# Patient Record
Sex: MALE | Race: OTHER | NOT HISPANIC OR LATINO
[De-identification: names, ages, dates, MRNs, and addresses within clinical notes are randomized per-mention and may not be internally consistent; named-entity substitution may affect disease eponyms.]

---

## 2023-10-11 ENCOUNTER — NON-APPOINTMENT (OUTPATIENT)
Age: 63
End: 2023-10-11

## 2023-12-07 ENCOUNTER — EMERGENCY (EMERGENCY)
Facility: HOSPITAL | Age: 63
LOS: 1 days | Discharge: HOME | End: 2023-12-07
Attending: EMERGENCY MEDICINE
Payer: COMMERCIAL

## 2023-12-07 VITALS
TEMPERATURE: 98 F | SYSTOLIC BLOOD PRESSURE: 124 MMHG | HEART RATE: 75 BPM | RESPIRATION RATE: 16 BRPM | OXYGEN SATURATION: 96 % | HEIGHT: 67 IN | WEIGHT: 195.11 LBS | DIASTOLIC BLOOD PRESSURE: 80 MMHG

## 2023-12-07 PROCEDURE — 27780 TREATMENT OF FIBULA FRACTURE: CPT | Mod: 54,RT

## 2023-12-07 PROCEDURE — 29515 APPLICATION SHORT LEG SPLINT: CPT | Mod: RT

## 2023-12-07 PROCEDURE — 99284 EMERGENCY DEPT VISIT MOD MDM: CPT | Mod: 57

## 2023-12-07 PROCEDURE — 99283 EMERGENCY DEPT VISIT LOW MDM: CPT | Mod: 25

## 2023-12-07 PROCEDURE — 73610 X-RAY EXAM OF ANKLE: CPT | Mod: 26,RT

## 2023-12-07 PROCEDURE — 73610 X-RAY EXAM OF ANKLE: CPT

## 2023-12-07 RX ORDER — OXYCODONE AND ACETAMINOPHEN 5; 325 MG/1; MG/1
1 TABLET ORAL
Qty: 12 | Refills: 0
Start: 2023-12-07 | End: 2023-12-09

## 2023-12-07 RX ORDER — ACETAMINOPHEN 500 MG
650 TABLET ORAL ONCE
Refills: 0 | Status: COMPLETED | OUTPATIENT
Start: 2023-12-07 | End: 2023-12-07

## 2023-12-07 RX ORDER — IBUPROFEN 200 MG
1 TABLET ORAL
Qty: 21 | Refills: 0
Start: 2023-12-07 | End: 2023-12-13

## 2023-12-07 RX ORDER — IBUPROFEN 200 MG
600 TABLET ORAL ONCE
Refills: 0 | Status: COMPLETED | OUTPATIENT
Start: 2023-12-07 | End: 2023-12-07

## 2023-12-07 RX ADMIN — Medication 650 MILLIGRAM(S): at 19:30

## 2023-12-07 RX ADMIN — Medication 650 MILLIGRAM(S): at 18:49

## 2023-12-07 RX ADMIN — Medication 600 MILLIGRAM(S): at 18:49

## 2023-12-07 RX ADMIN — Medication 600 MILLIGRAM(S): at 19:30

## 2023-12-07 NOTE — ED ADULT NURSE NOTE - CAS EDN DISCHARGE INTERVENTIONS
crutches given for ambulation with instructions and return demonstRATION DONE right ankle splint crutches given for ambulation with instructions and return demonstRATION DONE

## 2023-12-07 NOTE — ED PROVIDER NOTE - NSFOLLOWUPCLINICS_GEN_ALL_ED_FT
[facility]  Podiatry/Wound Care  [address] [place] [address]  Phone: [phone]  Fax: [phone]     [facility]  Podiatry/Wound Care  [address] [place] [address]  Phone: [phone]  Fax: [phone]

## 2023-12-07 NOTE — ED PROVIDER NOTE - PATIENT PORTAL LINK FT
You can access the FollowMyHealth Patient Portal offered by [facility] by registering at the following website: [email or web address]. By joining [employer]’s FollowMyHealth portal, you will also be able to view your health information using other applications (apps) compatible with our system. You can access the FollowMyHealth Patient Portal offered by [facility] by registering at the following website: [email or web address]. By joining [employer]’s FollowMyHealth portal, you will also be able to view your health information using other applications (apps) compatible with our system.

## 2023-12-07 NOTE — ED PROVIDER NOTE - OBJECTIVE STATEMENT
63 year old male presents with right ankle pain s/p a fall 1 week ago.  was walking down a ramp in his apt building and fell.  initially was able to ambulate but then the next day much more pain. has been using topical pain meds without relief. denies all other complaints.

## 2023-12-07 NOTE — ED ADULT NURSE NOTE - NSFALLUNIVINTERV_ED_ALL_ED
Bed/Stretcher in lowest position, wheels locked, appropriate side rails in place/Call bell, personal items and telephone in reach/Instruct patient to call for assistance before getting out of bed/chair/stretcher/Non-slip footwear applied when patient is off stretcher/[place] to call system/Physically safe environment - no spills, clutter or unnecessary equipment/Purposeful proactive rounding/Room/bathroom lighting operational, light cord in reach Bed/Stretcher in lowest position, wheels locked, appropriate side rails in place/Call bell, personal items and telephone in reach/Instruct patient to call for assistance before getting out of bed/chair/stretcher/Non-slip footwear applied when patient is off stretcher/[place] to call system/Physically safe environment - no spills, clutter or unnecessary equipment/Purposeful proactive rounding/Room/bathroom lighting operational, light cord in reach

## 2023-12-07 NOTE — ED PROVIDER NOTE - NSFOLLOWUPINSTRUCTIONS_ED_ALL_ED_FT
Ankle Fracture    The ankle joint is made up of the lower (distal) sections of the lower leg bones, called the tibia and fibula, along with a bone in the foot called the talus. An ankle fracture is a break in one, two, or all three of these sections of bone. There are two general types of ankle fractures:  Stable fracture. This happens when one of the bones is broken, but the bones of the ankle joint stay in their normal positions.  Unstable fracture. This type can include more than one broken bone. It can also happen if the outer bone is broken and the strong tissues that connect bones to each other (ligaments) are also injured at the inner ankle. This type of fracture allows the talus to move out of its normal position.  What are the causes?  This condition may be caused by:  A hard, direct hit to the ankle.  Quickly and severely twisting your ankle, often while your foot is planted and the rest of your body is moving.  Trauma, such as from a car crash or a fall from a height.  What increases the risk?  The following factors may make you more likely to develop this condition:  Being overweight.  Participating in sports that involve quick direction changes, as in soccer.  Doing high-impact sports such as gymnastics or football.  What are the signs or symptoms?    Symptoms of this condition include:  A tender and swollen ankle.  Bruising around your injured ankle.  Pain when moving or pressing on your ankle.  Trouble walking or using your ankle to support your body weight (putting weight on your ankle).  Pain that gets worse when you move your foot or ankle or when you stand.  Pain that gets better with rest.  How is this diagnosed?  An ankle fracture is usually diagnosed with a physical exam and X-rays. You may also have a CT scan or an MRI.    How is this treated?  Treatment for this condition depends on the type of ankle fracture you have. Stable fractures are treated with a cast, boot, or splint to hold the ankle still and crutches to avoid putting weight on the ankle until the fracture heals. Unstable fractures require surgery to ensure that the bones heal properly. After surgery, you will have a splint. After your incision has healed, your surgeon may give you a cast or a boot. You will not be able to put weight on your injured side for several weeks.    After your ankle has healed, you will do physical therapy exercises to improve movement and strength in your ankle.    Follow these instructions at home:  If you have a boot or splint:    Wear the boot or splint as told by your health care provider. Remove it only as told by your health care provider.  Loosen it if your toes tingle, become numb, or turn cold and blue.  Keep it clean and dry.  If you have a cast:    Do not put pressure on any part of the cast until it is fully hardened. This may take several hours.  Do not stick anything inside the cast to scratch your skin. Doing that increases your risk of infection.  Check the skin around the cast every day. Tell your health care provider about any concerns.  You may put lotion on dry skin around the edges of the cast. Do not put lotion on the skin underneath the cast.  Keep it clean and dry.  Bathing    Do not take baths, swim, or use a hot tub until your health care provider approves. Ask your health care provider if you may take showers. You may only be allowed to take sponge baths.  If the cast, boot, or splint is not waterproof:  Do not let it get wet.  Cover it with a watertight covering when you take a bath or shower.  Managing pain, stiffness, and swelling      If directed, put ice on the injured area. To do this:  If you have a removable splint or boot, remove it as told by your health care provider.  Put ice in a plastic bag.  Place a towel between your skin and the bag or between your cast and the bag.  Leave the ice on for 20 minutes, 2–3 times a day.  Remove the ice if your skin turns bright red. This is very important. If you cannot feel pain, heat, or cold, you have a greater risk of damage to the area.  Move your toes often to reduce stiffness and swelling.  Raise (elevate) the injured area above the level of your heart while you are sitting or lying down.  Activity    Do exercises as told by your health care provider.  Return to your normal activities as told by your health care provider. Ask your health care provider what activities are safe for you.  Do not use the injured limb to support your body weight until your health care provider says that you can. Use crutches as told by your health care provider.  General instructions    Take over-the-counter and prescription medicines only as told by your health care provider.  Ask your health care provider when it is safe to drive if you have a cast, boot, or splint on your ankle.  Do not use any products that contain nicotine or tobacco, such as cigarettes, e-cigarettes, and chewing tobacco. These can delay bone healing. If you need help quitting, ask your health care provider.  Keep all follow-up visits. This is important.  Contact a health care provider if:  You have pain or swelling that gets worse or does not get better with rest or medicine.  Your cast gets damaged.  Get help right away if:  You have severe pain that lasts.  You develop new pain or swelling.  Your skin or toenails below the injury turn blue or gray, feel cold, become numb, or are less sensitive to the touch.  Summary  An ankle fracture can be stable or unstable. This is determined after a physical exam and imaging studies such as X-rays, a CT scan, or an MRI.  Stable fractures are treated with a cast, boot, or splint to hold the ankle still until the fracture heals. Unstable fractures require surgery to ensure that the bones heal properly.  You will not be able to put weight on your injured side for several weeks.  Medicines, icing, and raising (elevating) your injured ankle when you are sitting or lying down may help with pain relief. Follow instructions as told by your health care provider.  This information is not intended to replace advice given to you by your health care provider. Make sure you discuss any questions you have with your health care provider.    Document Revised: 03/18/2021 Document Reviewed: 03/18/2021  [employer] Patient Education © 2023 [employer]  [employer] logo  Terms and Conditions  Privacy Policy  Editorial Policy  All content on this site: Copyright © 2023 [employer], its licensors, and contributors. All rights are reserved, including those for text and data mining, AI training, and similar technologies. For all open access content, the Creative Commons licensing terms apply.  Cookies are used by this site. To decline or learn more, visit our Cookies page. Ankle Fracture    The ankle joint is made up of the lower (distal) sections of the lower leg bones, called the tibia and fibula, along with a bone in the foot called the talus. An ankle fracture is a break in one, two, or all three of these sections of bone. There are two general types of ankle fractures:  Stable fracture. This happens when one of the bones is broken, but the bones of the ankle joint stay in their normal positions.  Unstable fracture. This type can include more than one broken bone. It can also happen if the outer bone is broken and the strong tissues that connect bones to each other (ligaments) are also injured at the inner ankle. This type of fracture allows the talus to move out of its normal position.  What are the causes?  This condition may be caused by:  A hard, direct hit to the ankle.  Quickly and severely twisting your ankle, often while your foot is planted and the rest of your body is moving.  Trauma, such as from a car crash or a fall from a height.  What increases the risk?  The following factors may make you more likely to develop this condition:  Being overweight.  Participating in sports that involve quick direction changes, as in soccer.  Doing high-impact sports such as gymnastics or football.  What are the signs or symptoms?    Symptoms of this condition include:  A tender and swollen ankle.  Bruising around your injured ankle.  Pain when moving or pressing on your ankle.  Trouble walking or using your ankle to support your body weight (putting weight on your ankle).  Pain that gets worse when you move your foot or ankle or when you stand.  Pain that gets better with rest.  How is this diagnosed?  An ankle fracture is usually diagnosed with a physical exam and X-rays. You may also have a CT scan or an MRI.    How is this treated?  Treatment for this condition depends on the type of ankle fracture you have. Stable fractures are treated with a cast, boot, or splint to hold the ankle still and crutches to avoid putting weight on the ankle until the fracture heals. Unstable fractures require surgery to ensure that the bones heal properly. After surgery, you will have a splint. After your incision has healed, your surgeon may give you a cast or a boot. You will not be able to put weight on your injured side for several weeks.    After your ankle has healed, you will do physical therapy exercises to improve movement and strength in your ankle.    Follow these instructions at home:  If you have a boot or splint:    Wear the boot or splint as told by your health care provider. Remove it only as told by your health care provider.  Loosen it if your toes tingle, become numb, or turn cold and blue.  Keep it clean and dry.  If you have a cast:    Do not put pressure on any part of the cast until it is fully hardened. This may take several hours.  Do not stick anything inside the cast to scratch your skin. Doing that increases your risk of infection.  Check the skin around the cast every day. Tell your health care provider about any concerns.  You may put lotion on dry skin around the edges of the cast. Do not put lotion on the skin underneath the cast.  Keep it clean and dry.  Bathing    Do not take baths, swim, or use a hot tub until your health care provider approves. Ask your health care provider if you may take showers. You may only be allowed to take sponge baths.  If the cast, boot, or splint is not waterproof:  Do not let it get wet.  Cover it with a watertight covering when you take a bath or shower.  Managing pain, stiffness, and swelling      If directed, put ice on the injured area. To do this:  If you have a removable splint or boot, remove it as told by your health care provider.  Put ice in a plastic bag.  Place a towel between your skin and the bag or between your cast and the bag.  Leave the ice on for 20 minutes, 2–3 times a day.  Remove the ice if your skin turns bright red. This is very important. If you cannot feel pain, heat, or cold, you have a greater risk of damage to the area.  Move your toes often to reduce stiffness and swelling.  Raise (elevate) the injured area above the level of your heart while you are sitting or lying down.  Activity    Do exercises as told by your health care provider.  Return to your normal activities as told by your health care provider. Ask your health care provider what activities are safe for you.  Do not use the injured limb to support your body weight until your health care provider says that you can. Use crutches as told by your health care provider.  General instructions    Take over-the-counter and prescription medicines only as told by your health care provider.  Ask your health care provider when it is safe to drive if you have a cast, boot, or splint on your ankle.  Do not use any products that contain nicotine or tobacco, such as cigarettes, e-cigarettes, and chewing tobacco. These can delay bone healing. If you need help quitting, ask your health care provider.  Keep all follow-up visits. This is important.  Contact a health care provider if:  You have pain or swelling that gets worse or does not get better with rest or medicine.  Your cast gets damaged.  Get help right away if:  You have severe pain that lasts.  You develop new pain or swelling.  Your skin or toenails below the injury turn blue or gray, feel cold, become numb, or are less sensitive to the touch.  Summary  An ankle fracture can be stable or unstable. This is determined after a physical exam and imaging studies such as X-rays, a CT scan, or an MRI.  Stable fractures are treated with a cast, boot, or splint to hold the ankle still until the fracture heals. Unstable fractures require surgery to ensure that the bones heal properly.  You will not be able to put weight on your injured side for several weeks.  Medicines, icing, and raising (elevating) your injured ankle when you are sitting or lying down may help with pain relief. Follow instructions as told by your health care provider.  This information is not intended to replace advice given to you by your health care provider. Make sure you discuss any questions you have with your health care provider.    Document Revised: 03/18/2021 Document Reviewed: 03/18/2021  [employer] Patient Education © 2023 [employer]  [employer] logo  Terms and Conditions  Privacy Policy  Editorial Policy  All content on this site: Copyright © 2023 [employer], its licensors, and contributors. All rights are reserved, including those for text and data mining, AI training, and similar technologies. For all open access content, the Creative Commons licensing terms apply.  Cookies are used by this site. To decline or learn more, visit our Cookies page.

## 2023-12-07 NOTE — ED PROVIDER NOTE - PROGRESS NOTE DETAILS
xray with distal fibular fracture. splinted. given crutches. will dc. f/u with podiatry. return precautions discussed.

## 2023-12-12 ENCOUNTER — APPOINTMENT (OUTPATIENT)
Dept: PODIATRY | Facility: CLINIC | Age: 63
End: 2023-12-12
Payer: MEDICARE

## 2023-12-12 DIAGNOSIS — Z82.49 FAMILY HISTORY OF ISCHEMIC HEART DISEASE AND OTHER DISEASES OF THE CIRCULATORY SYSTEM: ICD-10-CM

## 2023-12-12 DIAGNOSIS — I10 ESSENTIAL (PRIMARY) HYPERTENSION: ICD-10-CM

## 2023-12-12 DIAGNOSIS — Z78.9 OTHER SPECIFIED HEALTH STATUS: ICD-10-CM

## 2023-12-12 PROBLEM — Z00.00 ENCOUNTER FOR PREVENTIVE HEALTH EXAMINATION: Status: ACTIVE | Noted: 2023-12-12

## 2023-12-12 PROCEDURE — 27786 TREATMENT OF ANKLE FRACTURE: CPT | Mod: RT

## 2023-12-12 PROCEDURE — 99203 OFFICE O/P NEW LOW 30 MIN: CPT | Mod: 57,25

## 2023-12-12 PROCEDURE — 73610 X-RAY EXAM OF ANKLE: CPT | Mod: RT

## 2023-12-12 RX ORDER — METOPROLOL SUCCINATE 50 MG/1
50 TABLET, EXTENDED RELEASE ORAL
Refills: 0 | Status: ACTIVE | COMMUNITY

## 2023-12-12 RX ORDER — ATORVASTATIN CALCIUM 80 MG/1
80 TABLET, FILM COATED ORAL
Refills: 0 | Status: ACTIVE | COMMUNITY

## 2023-12-12 RX ORDER — ASPIRIN 81 MG/1
81 TABLET ORAL
Refills: 0 | Status: ACTIVE | COMMUNITY

## 2023-12-12 RX ORDER — AMLODIPINE BESYLATE 10 MG/1
10 TABLET ORAL
Refills: 0 | Status: ACTIVE | COMMUNITY

## 2023-12-12 RX ORDER — CLOPIDOGREL BISULFATE 75 MG/1
75 TABLET, FILM COATED ORAL
Refills: 0 | Status: ACTIVE | COMMUNITY

## 2023-12-14 NOTE — ASSESSMENT
[FreeTextEntry1] : Impression: Non-displaced fracture, right ankle, closed (S82.64XA)  Treatment: Patient was placed into a Cam boot by the orthotist.  He can be full weight bearing.  Will reevaluate in 2 weeks. Any questions or problems, patient is to contact the office.

## 2023-12-14 NOTE — PROCEDURE
[FreeTextEntry1] : X-rays from [facility] were independently reviewed. (3 views - right ankle, non-weight bearing) There is a distal fibula fracture.  No medial malleolus.  He has minimal swelling about the ankle.  At this time, I feel that a full weight bearing x-ray will determine whether there has been any increase in medial clear space.    (3 views - right ankle, weight bearing) These were taken today.   No significant displacement of the fibula.  No shift of the mortise.  No increase in medial clear space.

## 2023-12-14 NOTE — HISTORY OF PRESENT ILLNESS
[FreeTextEntry1] : Patient presents today for follow up of a right ankle fracture that occurred 2 weeks ago.  He was seen at [facility].  He presents today with an A/O splint and he is using crutches.

## 2023-12-14 NOTE — PHYSICAL EXAM
[General Appearance - Alert] : alert [Oriented To Time, Place, And Person] : oriented to person, place, and time [Varicose Veins Of Lower Extremities] : not present [] : not present [Delayed in the Right Toes] : capillary refills normal in right toes [Delayed in the Left Toes] : capillary refills normal in the left toes [Foot Ulcer] : no foot ulcer [Skin Induration] : no skin induration

## 2024-01-03 ENCOUNTER — APPOINTMENT (OUTPATIENT)
Dept: PODIATRY | Facility: CLINIC | Age: 64
End: 2024-01-03
Payer: MEDICARE

## 2024-01-03 DIAGNOSIS — S82.64XA NONDISPLACED FRACTURE OF LATERAL MALLEOLUS OF RIGHT FIBULA, INITIAL ENCOUNTER FOR CLOSED FRACTURE: ICD-10-CM

## 2024-01-03 PROCEDURE — 73610 X-RAY EXAM OF ANKLE: CPT | Mod: RT

## 2024-01-04 NOTE — REASON FOR VISIT
[Follow-Up Visit] : a follow-up visit for [Other:___] : [unfilled] [Confirmed Ankle Fracture] : confirmed ankle fracture

## 2024-01-05 NOTE — PROCEDURE
[FreeTextEntry1] : X-rays were taken to evaluate healing. (3 views - right ankle) No callus noted about the fibula.  No shift in the mortise.  It is healing satisfactorily.

## 2024-01-05 NOTE — PHYSICAL EXAM
[General Appearance - Alert] : alert [Ankle Swelling (On Exam)] : present [Ankle Swelling On The Right] : mild [2+] : left foot dorsalis pedis 2+ [Skin Color & Pigmentation] : normal skin color and pigmentation [Skin Turgor] : normal skin turgor [Skin Lesions] : no skin lesions [Sensation] : the sensory exam was normal to light touch and pinprick [No Focal Deficits] : no focal deficits [Deep Tendon Reflexes (DTR)] : deep tendon reflexes were 2+ and symmetric [Motor Exam] : the motor exam was normal [Varicose Veins Of Lower Extremities] : not present [] : not present [Delayed in the Right Toes] : capillary refills normal in right toes [Delayed in the Left Toes] : capillary refills normal in the left toes [de-identified] : Patient has minimal pain.  There is good ROM.   [Foot Ulcer] : no foot ulcer [Skin Induration] : no skin induration [FreeTextEntry1] : There is minimal swelling.

## 2024-01-05 NOTE — HISTORY OF PRESENT ILLNESS
[Sneakers] : grant [FreeTextEntry1] : Patient presents today for reevaluation of right fibula fracture.  He is doing well.  No new changes to his medical status.

## 2024-01-05 NOTE — ASSESSMENT
[FreeTextEntry1] : Impression: Non-displaced fracture, right ankle, closed (S82.64XA)  Treatment: Patient can attempt to transition out of the Cam boot over the next few weeks.  Will reevaluate in 3 weeks.  Any questions or problems, patient is to contact the office.

## 2024-01-30 ENCOUNTER — APPOINTMENT (OUTPATIENT)
Dept: PODIATRY | Facility: CLINIC | Age: 64
End: 2024-01-30

## 2025-06-06 NOTE — ED ADULT NURSE NOTE - PRO INTERPRETER NEED 2
Patient called to let us know to expect a surgical clearance request from Dr. Ennis for a dental extraction for which they want him to stop his warfarin. The dental procedure is scheduled for next week and is urgent because it needs to be done at least 2 weeks before his knee surgery or they will need to reschedule it.   
English